# Patient Record
Sex: MALE | Race: WHITE | Employment: FULL TIME | URBAN - METROPOLITAN AREA
[De-identification: names, ages, dates, MRNs, and addresses within clinical notes are randomized per-mention and may not be internally consistent; named-entity substitution may affect disease eponyms.]

---

## 2018-01-23 ENCOUNTER — OFFICE VISIT (OUTPATIENT)
Dept: FAMILY MEDICINE | Facility: CLINIC | Age: 23
End: 2018-01-23
Payer: COMMERCIAL

## 2018-01-23 ENCOUNTER — TELEPHONE (OUTPATIENT)
Dept: FAMILY MEDICINE | Facility: CLINIC | Age: 23
End: 2018-01-23

## 2018-01-23 VITALS
SYSTOLIC BLOOD PRESSURE: 128 MMHG | DIASTOLIC BLOOD PRESSURE: 71 MMHG | WEIGHT: 219 LBS | HEART RATE: 58 BPM | HEIGHT: 74 IN | TEMPERATURE: 98.2 F | OXYGEN SATURATION: 97 % | BODY MASS INDEX: 28.11 KG/M2

## 2018-01-23 DIAGNOSIS — F41.9 ANXIETY: ICD-10-CM

## 2018-01-23 DIAGNOSIS — R74.01 ELEVATED ALT MEASUREMENT: ICD-10-CM

## 2018-01-23 DIAGNOSIS — F98.8 ATTENTION DEFICIT DISORDER, UNSPECIFIED HYPERACTIVITY PRESENCE: ICD-10-CM

## 2018-01-23 DIAGNOSIS — F33.41 RECURRENT MAJOR DEPRESSIVE DISORDER, IN PARTIAL REMISSION (H): Primary | ICD-10-CM

## 2018-01-23 LAB
ALBUMIN SERPL-MCNC: 4.3 G/DL (ref 3.4–5)
ALP SERPL-CCNC: 66 U/L (ref 40–150)
ALT SERPL W P-5'-P-CCNC: 60 U/L (ref 0–70)
AST SERPL W P-5'-P-CCNC: 31 U/L (ref 0–45)
BILIRUB DIRECT SERPL-MCNC: 0.1 MG/DL (ref 0–0.2)
BILIRUB SERPL-MCNC: 0.4 MG/DL (ref 0.2–1.3)
PROT SERPL-MCNC: 7.1 G/DL (ref 6.8–8.8)

## 2018-01-23 PROCEDURE — 99213 OFFICE O/P EST LOW 20 MIN: CPT | Performed by: INTERNAL MEDICINE

## 2018-01-23 PROCEDURE — 36415 COLL VENOUS BLD VENIPUNCTURE: CPT | Performed by: INTERNAL MEDICINE

## 2018-01-23 PROCEDURE — 80076 HEPATIC FUNCTION PANEL: CPT | Performed by: INTERNAL MEDICINE

## 2018-01-23 RX ORDER — LISDEXAMFETAMINE DIMESYLATE 30 MG/1
30 CAPSULE ORAL DAILY
Qty: 30 CAPSULE | Refills: 0 | Status: SHIPPED | OUTPATIENT
Start: 2018-03-26 | End: 2018-04-25

## 2018-01-23 RX ORDER — LISDEXAMFETAMINE DIMESYLATE 30 MG/1
30 CAPSULE ORAL DAILY
Qty: 30 CAPSULE | Refills: 0 | Status: SHIPPED | OUTPATIENT
Start: 2018-01-23 | End: 2018-02-22

## 2018-01-23 RX ORDER — ESCITALOPRAM OXALATE 10 MG/1
10 TABLET ORAL DAILY
Qty: 90 TABLET | Refills: 1 | Status: SHIPPED | OUTPATIENT
Start: 2018-01-23 | End: 2018-07-20

## 2018-01-23 RX ORDER — LISDEXAMFETAMINE DIMESYLATE 30 MG/1
30 CAPSULE ORAL DAILY
Qty: 30 CAPSULE | Refills: 0 | Status: SHIPPED | OUTPATIENT
Start: 2018-02-23 | End: 2018-03-25

## 2018-01-23 ASSESSMENT — PATIENT HEALTH QUESTIONNAIRE - PHQ9: SUM OF ALL RESPONSES TO PHQ QUESTIONS 1-9: 1

## 2018-01-23 NOTE — PROGRESS NOTES
It was a pleasure seeing you.  I wanted to get back to you with your test results.  I have enclosed a copy for your records.    Your liver tests are all normal, good news. If you have any questions please call me.

## 2018-01-23 NOTE — PROGRESS NOTES
The patient is here to follow-up on mental health issues.  He has been followed at the [facility] most recently for treatment of his depression, anxiety and ADD.  He is taking the medications listed for over 2 years and doing super.  He does not feel like he has any depression or anxiety issues.  He is not down, sad or anxious.    With respect to the ADD he has been on Vyvanse for a long time it takes 1 a day.  He does not use any drugs.  He does not smoke or drink significantly.  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  He feels quite well.  The ADD does help.    In reviewing his chart I see that he had a liver panel done in 2016 with an elevated ALT.  He has not had follow-up on this.  He has no known history of liver disease.  No family history of this.  He is not a drinker.    The patient feels fine.  No other symptoms..    Past Medical History:   Diagnosis Date     ADD (attention deficit disorder) 3rd grade    prior use of adderal     Anxiety 2010    on prozac, some mild depression     Major depression in partial remission (H)      Past Surgical History:   Procedure Laterality Date     circumcision revision  baby     HC REPAIR OF HYDROCELE,TUNICA  baby     uvuloplasty  2016    done by ent     Social History     Social History     Marital status: Single     Spouse name: N/A     Number of children: 0     Years of education: N/A     Occupational History     er tech      Social History Main Topics     Smoking status: Never Smoker     Smokeless tobacco: Never Used     Alcohol use Yes      Comment: occ     Drug use: No     Sexual activity: No     Other Topics Concern     Not on file     Social History Narrative     Current Outpatient Prescriptions   Medication Sig Dispense Refill     lisdexamfetamine (VYVANSE) 30 MG capsule Take 1 capsule (30 mg) by mouth daily 30 capsule 0     [START ON 2/23/2018] lisdexamfetamine (VYVANSE) 30 MG capsule Take 1 capsule (30 mg) by mouth  "daily 30 capsule 0     [START ON 3/26/2018] lisdexamfetamine (VYVANSE) 30 MG capsule Take 1 capsule (30 mg) by mouth daily 30 capsule 0     escitalopram (LEXAPRO) 10 MG tablet Take 1 tablet (10 mg) by mouth daily 90 tablet 1     [DISCONTINUED] escitalopram (LEXAPRO) 10 MG tablet Take 1 tablet (10 mg) by mouth daily 30 tablet 1     Allergies   Allergen Reactions     Ceclor [Cefaclor] Rash     FAMILY HISTORY NOTED AND REVIEWED    REVIEW OF SYSTEMS: above    PHYSICAL EXAM    /71  Pulse 58  Temp 98.2  F (36.8  C) (Oral)  Ht 6' 2\" (1.88 m)  Wt 219 lb (99.3 kg)  SpO2 97%  BMI 28.12 kg/m2    Patient appears non toxic  No exam but discussed with patient approx 15 min above    ASSESSMENT:  1. Depression and anxiety, doing well  2. Add, stable  3. Elevated lft, suspect fatty liver    PLAN:  Labs today  Follow up 3 months for med lucien Willard M.D.        "

## 2018-01-23 NOTE — TELEPHONE ENCOUNTER
PA needed on Vyvanse 30mg caps  Pt insurance is [employer]  Insurance phone number is [phone]  Pt ID number is [id number]    Please let us know when PA is granted/denied. Thank you!

## 2018-01-23 NOTE — NURSING NOTE
"Chief Complaint   Patient presents with     Recheck Medication       Initial /71  Pulse 58  Temp 98.2  F (36.8  C) (Oral)  Ht 6' 2\" (1.88 m)  Wt 219 lb (99.3 kg)  SpO2 97%  BMI 28.12 kg/m2 Estimated body mass index is 28.12 kg/(m^2) as calculated from the following:    Height as of this encounter: 6' 2\" (1.88 m).    Weight as of this encounter: 219 lb (99.3 kg).  Medication Reconciliation: complete   Desi Campos CMA      "

## 2018-01-23 NOTE — MR AVS SNAPSHOT
"              After Visit Summary   2018    Ha Lan    MRN: [id number]           Patient Information     Date Of Birth          1995        Visit Information        Provider Department      2018 9:00 AM Santi Willard MD [facility]        Today's Diagnoses     Recurrent major depressive disorder, in partial remission (H)    -  1    Attention deficit disorder, unspecified hyperactivity presence        Anxiety        Elevated ALT measurement           Follow-ups after your visit        Who to contact     If you have questions or need follow up information about today's clinic visit or your schedule please contact [facility] directly at [phone].  Normal or non-critical lab and imaging results will be communicated to you by MyChart, letter or phone within 4 business days after the clinic has received the results. If you do not hear from us within 7 days, please contact the clinic through MyChart or phone. If you have a critical or abnormal lab result, we will notify you by phone as soon as possible.  Submit refill requests through Ranberry or call your pharmacy and they will forward the refill request to us. Please allow 3 business days for your refill to be completed.          Additional Information About Your Visit        MyChart Information     Ranberry lets you send messages to your doctor, view your test results, renew your prescriptions, schedule appointments and more. To sign up, go to [email or web address] . Click on \"Log in\" on the left side of the screen, which will take you to the Welcome page. Then click on \"Sign up Now\" on the right side of the page.     You will be asked to enter the access code listed below, as well as some personal information. Please follow the directions to create your username and password.     Your access code is: [email or web address]  Expires: 2018  9:35 AM     Your access code will  in 90 days. If you need help or a new " "code, please call your [place] clinic or [phone].        Care EveryWhere ID     This is your Care EveryWhere ID. This could be used by other organizations to access your [place] medical records  [id number]        Your Vitals Were     Pulse Temperature Height Pulse Oximetry BMI (Body Mass Index)       58 98.2  F (36.8  C) (Oral) 6' 2\" (1.88 m) 97% 28.12 kg/m2        Blood Pressure from Last 3 Encounters:   01/23/18 128/71   05/18/15 126/77   06/13/14 114/70    Weight from Last 3 Encounters:   01/23/18 219 lb (99.3 kg)   05/18/15 228 lb 4.8 oz (103.6 kg)   06/13/14 215 lb (97.5 kg)              We Performed the Following     Hepatic panel     JUST IN CASE          Today's Medication Changes          These changes are accurate as of: 1/23/18  9:35 AM.  If you have any questions, ask your nurse or doctor.               These medicines have changed or have updated prescriptions.        Dose/Directions    * lisdexamfetamine 30 MG capsule   Commonly known as:  VYVANSE   This may have changed:    - medication strength  - how much to take  - when to take this   Used for:  Attention deficit disorder, unspecified hyperactivity presence   Changed by:  Santi Willard MD        Dose:  30 mg   Take 1 capsule (30 mg) by mouth daily   Quantity:  30 capsule   Refills:  0       * lisdexamfetamine 30 MG capsule   Commonly known as:  VYVANSE   This may have changed:  You were already taking a medication with the same name, and this prescription was added. Make sure you understand how and when to take each.   Used for:  Attention deficit disorder, unspecified hyperactivity presence   Changed by:  Santi Willard MD        Dose:  30 mg   Start taking on:  2/23/2018   Take 1 capsule (30 mg) by mouth daily   Quantity:  30 capsule   Refills:  0       * lisdexamfetamine 30 MG capsule   Commonly known as:  VYVANSE   This may have changed:  You were already taking a medication with the same name, and this prescription was " added. Make sure you understand how and when to take each.   Used for:  Attention deficit disorder, unspecified hyperactivity presence   Changed by:  Santi Willard MD        Dose:  30 mg   Start taking on:  3/26/2018   Take 1 capsule (30 mg) by mouth daily   Quantity:  30 capsule   Refills:  0       * Notice:  This list has 3 medication(s) that are the same as other medications prescribed for you. Read the directions carefully, and ask your doctor or other care provider to review them with you.         Where to get your medicines      Some of these will need a paper prescription and others can be bought over the counter.  Ask your nurse if you have questions.     Bring a paper prescription for each of these medications     escitalopram 10 MG tablet    lisdexamfetamine 30 MG capsule    lisdexamfetamine 30 MG capsule    lisdexamfetamine 30 MG capsule                Primary Care Provider Office Phone # Fax #    Santi Willard [phone] [address] [address]        Equal Access to Services     [facility] AH: Hadii aad ku hadasho Soomaali, waaxda luqadaha, qaybta kaalmada adeegyada, waxay idiin hayaan adeeg yeni gill . So [facility] [phone].    ATENCIÓN: Si habla español, tiene a augustine disposición servicios gratuitos de asistencia lingüística. Llame al [phone].    We comply with applicable federal civil rights laws and [place] laws. We do not discriminate on the basis of race, color, national origin, age, disability, sex, sexual orientation, or gender identity.            Thank you!     Thank you for choosing [facility]  for your care. Our goal is always to provide you with excellent care. Hearing back from our patients is one way we can continue to improve our services. Please take a few minutes to complete the written survey that you may receive in the mail after your visit with us. Thank you!             Your Updated Medication List -  Protect others around you: Learn how to safely use, store and throw away your medicines at www.disposemymeds.org.          This list is accurate as of: 1/23/18  9:35 AM.  Always use your most recent med list.                   Brand Name Dispense Instructions for use Diagnosis    escitalopram 10 MG tablet    LEXAPRO    90 tablet    Take 1 tablet (10 mg) by mouth daily    Recurrent major depressive disorder, in partial remission (H)       * lisdexamfetamine 30 MG capsule    VYVANSE    30 capsule    Take 1 capsule (30 mg) by mouth daily    Attention deficit disorder, unspecified hyperactivity presence       * lisdexamfetamine 30 MG capsule   Start taking on:  2/23/2018    VYVANSE    30 capsule    Take 1 capsule (30 mg) by mouth daily    Attention deficit disorder, unspecified hyperactivity presence       * lisdexamfetamine 30 MG capsule   Start taking on:  3/26/2018    VYVANSE    30 capsule    Take 1 capsule (30 mg) by mouth daily    Attention deficit disorder, unspecified hyperactivity presence       * Notice:  This list has 3 medication(s) that are the same as other medications prescribed for you. Read the directions carefully, and ask your doctor or other care provider to review them with you.

## 2018-01-23 NOTE — LETTER
[place] [address] [facility]  [address]  [place]  [address]  Tel: [phone]    January 23, 2018    Ha Lan  [facility] [facility] [address]        Dear Mr. Lan,    It was a pleasure seeing you.  I wanted to get back to you with your test results.  I have enclosed a copy for your records.     Your liver tests are all normal, good news. If you have any questions please call me.       Sincerely,    Santi Willard MD/andriy    Results for orders placed or performed in visit on 01/23/18   Hepatic panel   Result Value Ref Range    Bilirubin Direct 0.1 0.0 - 0.2 mg/dL    Bilirubin Total 0.4 0.2 - 1.3 mg/dL    Albumin 4.3 3.4 - 5.0 g/dL    Protein Total 7.1 6.8 - 8.8 g/dL    Alkaline Phosphatase 66 40 - 150 U/L    ALT 60 0 - 70 U/L    AST 31 0 - 45 U/L           Enclosure: Lab Results

## 2018-01-24 NOTE — TELEPHONE ENCOUNTER
PA has been sent to [employer] for further review of the Vyvanse capsule.    Manuel Spain, CMA

## 2018-01-29 NOTE — TELEPHONE ENCOUNTER
[employer] sent me a form with additional questions. I filled these out and sent back to [employer] for further review.    Manuel Spain, ELIZABETH

## 2018-02-01 NOTE — TELEPHONE ENCOUNTER
PA has been approved for the Vyvanse 30 mg capsules through [employer]. Approval is good from 1/29/18 - 1/29/19. I called the pharmacy to let them know.  Manuel Spain, CMA

## 2018-02-01 NOTE — TELEPHONE ENCOUNTER
I was also able to get this PA back-dated to 1/23/18 when the patient paid out of pocket for this drug.    Manuel Spain, CMA

## 2018-06-22 DIAGNOSIS — F33.41 RECURRENT MAJOR DEPRESSIVE DISORDER, IN PARTIAL REMISSION (H): Primary | ICD-10-CM

## 2018-06-22 RX ORDER — LISDEXAMFETAMINE DIMESYLATE 10 MG/1
25 CAPSULE ORAL
COMMUNITY
Start: 2015-05-16 | End: 2018-06-22

## 2018-06-22 NOTE — TELEPHONE ENCOUNTER
Reason for Call:  Medication or medication refill:    Do you use a [facility]?  Name of the pharmacy and phone number for the current request:      [facility] [place], [place] - [address]          Name of the medication requested: lisdexamfetamine (VYVANSE) 30 MG capsule        Other request:     Can we leave a detailed message on this number? YES    Phone number patient can be reached at: Home number on file [phone] (home)    Best Time:   Call taken on 6/22/2018 at 2:54 PM by Jenni Rehman

## 2018-06-25 NOTE — TELEPHONE ENCOUNTER
To PCP:     Routing refill request to provider for review/approval because:  Medication is reported/historical        Called pt to schedule OV, past due for 3  Month OV for Med Check    Pt Reps: Please schedule for OV upon return call and route back to Refills.     Thank you!   Maile RAI RN

## 2018-06-27 RX ORDER — LISDEXAMFETAMINE DIMESYLATE 30 MG/1
25 CAPSULE ORAL EVERY MORNING
Qty: 30 CAPSULE | Refills: 0 | Status: SHIPPED | OUTPATIENT
Start: 2018-06-27 | End: 2018-07-20

## 2018-06-27 NOTE — TELEPHONE ENCOUNTER
Routing refill request to provider for review/approval because:  Medication is reported/historical    Thank you,  Eleno BARAKAT RN

## 2018-07-20 ENCOUNTER — OFFICE VISIT (OUTPATIENT)
Dept: FAMILY MEDICINE | Facility: CLINIC | Age: 23
End: 2018-07-20
Payer: COMMERCIAL

## 2018-07-20 VITALS
OXYGEN SATURATION: 96 % | WEIGHT: 226.2 LBS | BODY MASS INDEX: 29.03 KG/M2 | HEART RATE: 70 BPM | HEIGHT: 74 IN | DIASTOLIC BLOOD PRESSURE: 74 MMHG | SYSTOLIC BLOOD PRESSURE: 131 MMHG | TEMPERATURE: 97.6 F

## 2018-07-20 DIAGNOSIS — F98.8 ATTENTION DEFICIT DISORDER, UNSPECIFIED HYPERACTIVITY PRESENCE: Primary | ICD-10-CM

## 2018-07-20 DIAGNOSIS — F33.41 RECURRENT MAJOR DEPRESSIVE DISORDER, IN PARTIAL REMISSION (H): ICD-10-CM

## 2018-07-20 PROCEDURE — 99213 OFFICE O/P EST LOW 20 MIN: CPT | Performed by: INTERNAL MEDICINE

## 2018-07-20 RX ORDER — ESCITALOPRAM OXALATE 10 MG/1
10 TABLET ORAL DAILY
Qty: 90 TABLET | Refills: 1 | Status: SHIPPED | OUTPATIENT
Start: 2018-07-20 | End: 2018-10-22

## 2018-07-20 RX ORDER — LISDEXAMFETAMINE DIMESYLATE 30 MG/1
25 CAPSULE ORAL EVERY MORNING
Qty: 60 CAPSULE | Refills: 0 | Status: SHIPPED | OUTPATIENT
Start: 2018-07-20 | End: 2018-10-03

## 2018-07-20 NOTE — MR AVS SNAPSHOT
After Visit Summary   7/20/2018    Ha Lan    MRN: [id number]           Patient Information     Date Of Birth          1995        Visit Information        Provider Department      7/20/2018 2:30 PM Santi Willard MD [facility]        Today's Diagnoses     Attention deficit disorder, unspecified hyperactivity presence    -  1    Recurrent major depressive disorder, in partial remission (H)           Follow-ups after your visit        Your next 10 appointments already scheduled     Oct 22, 2018  3:00 PM CDT   Office Visit with Santi Willard MD   [facility]    [address] [facility] [id number] [phone]           Bring a current list of meds and any records pertaining to this visit. For Physicals, please bring immunization records and any forms needing to be filled out. Please arrive 10 minutes early to complete paperwork.              Who to contact     If you have questions or need follow up information about today's clinic visit or your schedule please contact [facility] directly at [phone].  Normal or non-critical lab and imaging results will be communicated to you by artandseekhart, letter or phone within 4 business days after the clinic has received the results. If you do not hear from us within 7 days, please contact the clinic through [employer] or phone. If you have a critical or abnormal lab result, we will notify you by phone as soon as possible.  Submit refill requests through [employer] or call your pharmacy and they will forward the refill request to us. Please allow 3 business days for your refill to be completed.          Additional Information About Your Visit        MyChart Information     [employer] gives you secure access to your electronic health record. If you see a primary care provider, you can also send messages to your care team and make appointments. If you have questions, please call your  "primary care clinic.  If you do not have a primary care provider, please call [phone] and they will assist you.        Care EveryWhere ID     This is your Care EveryWhere ID. This could be used by other organizations to access your [place] medical records  [id number]        Your Vitals Were     Pulse Temperature Height Pulse Oximetry BMI (Body Mass Index)       70 97.6  F (36.4  C) (Oral) 6' 2\" (1.88 m) 96% 29.04 kg/m2        Blood Pressure from Last 3 Encounters:   07/20/18 131/74   01/23/18 128/71   05/18/15 126/77    Weight from Last 3 Encounters:   07/20/18 226 lb 3.2 oz (102.6 kg)   01/23/18 219 lb (99.3 kg)   05/18/15 228 lb 4.8 oz (103.6 kg)              Today, you had the following     No orders found for display         Where to get your medicines      These medications were sent to [id number] - [place], [place] - [address] [facility]  [address], [place] [address]    Hours:  24-hours Phone:  [phone]     escitalopram 10 MG tablet         Some of these will need a paper prescription and others can be bought over the counter.  Ask your nurse if you have questions.     Bring a paper prescription for each of these medications     lisdexamfetamine 30 MG capsule          Primary Care Provider Office Phone # Fax #    Santi Willard -[phone] [address] [facility] [address]  [facility] [address]        Equal Access to Services     [facility]: Hadii armando benites hadrafita Sobrian, waaxda luqadaha, qaybta kaalmanorberto luis. So [facility] [phone].    ATENCIÓN: Si habla español, tiene a augustine disposición servicios gratuitos de asistencia lingüística. Llame al [phone].    We comply with applicable federal civil rights laws and [place] laws. We do not discriminate on the basis of race, color, national origin, age, disability, sex, sexual orientation, or gender identity.            Thank you!     Thank you " for choosing [facility]  for your care. Our goal is always to provide you with excellent care. Hearing back from our patients is one way we can continue to improve our services. Please take a few minutes to complete the written survey that you may receive in the mail after your visit with us. Thank you!             Your Updated Medication List - Protect others around you: Learn how to safely use, store and throw away your medicines at www.disposemymeds.org.          This list is accurate as of 7/20/18  3:38 PM.  Always use your most recent med list.                   Brand Name Dispense Instructions for use Diagnosis    escitalopram 10 MG tablet    LEXAPRO    90 tablet    Take 1 tablet (10 mg) by mouth daily    Recurrent major depressive disorder, in partial remission (H)       lisdexamfetamine 30 MG capsule    VYVANSE    60 capsule    Take 1 capsule (30 mg) by mouth every morning    Recurrent major depressive disorder, in partial remission (H)

## 2018-07-20 NOTE — PROGRESS NOTES
"The patient is here to follow-up on his depression and ADD.  He is doing extremely well.  He is taking his medicines regularly and really does not have any significant depression or anxiety.  He uses the Vyvanse regularly and it seems to help quite a bit.  He has no side effects from this.  He otherwise feels very well.  He is working the overnight shift as an ER tech.  He has no complaints at this time.    Past Medical History:   Diagnosis Date     ADD (attention deficit disorder) 3rd grade    prior use of adderal     Anxiety 2010    on prozac, some mild depression     Major depression in partial remission (H)      Past Surgical History:   Procedure Laterality Date     circumcision revision  baby     HC REPAIR OF HYDROCELE,TUNICA  baby     uvuloplasty  2016    done by ent     Social History     Social History     Marital status: Single     Spouse name: N/A     Number of children: 0     Years of education: N/A     Occupational History     er tech      Social History Main Topics     Smoking status: Never Smoker     Smokeless tobacco: Never Used     Alcohol use Yes      Comment: occ     Drug use: No     Sexual activity: No     Other Topics Concern     Not on file     Social History Narrative     Current Outpatient Prescriptions   Medication Sig Dispense Refill     escitalopram (LEXAPRO) 10 MG tablet Take 1 tablet (10 mg) by mouth daily 90 tablet 1     lisdexamfetamine (VYVANSE) 30 MG capsule Take 1 capsule (30 mg) by mouth every morning 60 capsule 0     [DISCONTINUED] escitalopram (LEXAPRO) 10 MG tablet Take 1 tablet (10 mg) by mouth daily 90 tablet 1     Allergies   Allergen Reactions     Ceclor [Cefaclor] Rash     FAMILY HISTORY NOTED AND REVIEWED    REVIEW OF SYSTEMS: above    PHYSICAL EXAM    /74 (BP Location: Left arm, Patient Position: Chair, Cuff Size: Adult Large)  Pulse 70  Temp 97.6  F (36.4  C) (Oral)  Ht 6' 2\" (1.88 m)  Wt 226 lb 3.2 oz (102.6 kg)  SpO2 96%  BMI 29.04 kg/m2    Patient appears " non toxic  No exam but discussed with patient above    ASSESSMENT:  1. Add, stable  2. Depression, doing well    PLAN:  Refilled meds  Call if problems    Santi Willard M.D.

## 2018-07-21 ASSESSMENT — PATIENT HEALTH QUESTIONNAIRE - PHQ9: SUM OF ALL RESPONSES TO PHQ QUESTIONS 1-9: 1

## 2018-10-03 ENCOUNTER — MYC MEDICAL ADVICE (OUTPATIENT)
Dept: FAMILY MEDICINE | Facility: CLINIC | Age: 23
End: 2018-10-03

## 2018-10-03 DIAGNOSIS — F33.41 RECURRENT MAJOR DEPRESSIVE DISORDER, IN PARTIAL REMISSION (H): ICD-10-CM

## 2018-10-03 RX ORDER — LISDEXAMFETAMINE DIMESYLATE 30 MG/1
30 CAPSULE ORAL EVERY MORNING
Qty: 30 CAPSULE | Refills: 0 | Status: SHIPPED | OUTPATIENT
Start: 2018-10-03 | End: 2018-10-22

## 2018-10-03 NOTE — TELEPHONE ENCOUNTER
Routing to [facility] to review patient's MyLuvs message.   Please route back to RN so that patient can be notified.      Pended Rx Vyvanse---30 days.     Last Written Prescription Date:  7-20-18  Last Fill Quantity: 60,  # refills: 0   Last office visit: 7/20/2018 with prescribing provider:  7-20-18   Future Office Visit:   Next 5 appointments (look out 90 days)     Oct 22, 2018  3:00 PM CDT   [facility]    [address] [facility] [address]   [phone]                 Kerrie AMANDA RN,BSN

## 2018-10-22 ENCOUNTER — OFFICE VISIT (OUTPATIENT)
Dept: FAMILY MEDICINE | Facility: CLINIC | Age: 23
End: 2018-10-22
Payer: COMMERCIAL

## 2018-10-22 VITALS
HEIGHT: 74 IN | OXYGEN SATURATION: 96 % | DIASTOLIC BLOOD PRESSURE: 80 MMHG | TEMPERATURE: 97.5 F | WEIGHT: 226 LBS | BODY MASS INDEX: 29 KG/M2 | SYSTOLIC BLOOD PRESSURE: 127 MMHG | HEART RATE: 77 BPM

## 2018-10-22 DIAGNOSIS — F33.41 RECURRENT MAJOR DEPRESSIVE DISORDER, IN PARTIAL REMISSION (H): ICD-10-CM

## 2018-10-22 DIAGNOSIS — F98.8 ATTENTION DEFICIT DISORDER, UNSPECIFIED HYPERACTIVITY PRESENCE: ICD-10-CM

## 2018-10-22 DIAGNOSIS — F41.9 ANXIETY: Primary | ICD-10-CM

## 2018-10-22 PROCEDURE — 99212 OFFICE O/P EST SF 10 MIN: CPT | Performed by: INTERNAL MEDICINE

## 2018-10-22 RX ORDER — LISDEXAMFETAMINE DIMESYLATE 30 MG/1
30 CAPSULE ORAL EVERY MORNING
Qty: 30 CAPSULE | Refills: 0 | Status: SHIPPED | OUTPATIENT
Start: 2018-10-22 | End: 2018-10-22

## 2018-10-22 RX ORDER — ESCITALOPRAM OXALATE 10 MG/1
10 TABLET ORAL DAILY
Qty: 90 TABLET | Refills: 3 | Status: SHIPPED | OUTPATIENT
Start: 2018-10-22 | End: 2019-10-29

## 2018-10-22 RX ORDER — LISDEXAMFETAMINE DIMESYLATE 30 MG/1
30 CAPSULE ORAL EVERY MORNING
Qty: 30 CAPSULE | Refills: 0 | Status: SHIPPED | OUTPATIENT
Start: 2018-10-22 | End: 2019-03-11

## 2018-10-22 NOTE — PROGRESS NOTES
In the past he been followed at the [facility] but due to good stability the patient is here for follow-up on his depression, anxiety and ADD.  In the past he had been seen at the [facility] and as of January I had been seeing him for this.  He has been on Vyvanse for a long time taking 1 a day and this works very well.  There is been no signs of abuse.  He does not use any drugs.  He is not a drinker.    With respect to the depression and anxiety he has been taking his Lexapro and with this is done quite well without any problems or recurrent symptoms.  He does not feel down, sad or depressed.    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  He stays very busy.  He will begin his flu shot there.  He has not been sexually active.    Past Medical History:   Diagnosis Date     ADD (attention deficit disorder) 3rd grade    prior use of adderal     Anxiety 2010    on prozac, some mild depression     Major depression in partial remission (H)      Past Surgical History:   Procedure Laterality Date     circumcision revision  baby     HC REPAIR OF HYDROCELE,TUNICA  baby     uvuloplasty  2016    done by ent     Social History     Social History     Marital status: Single     Spouse name: N/A     Number of children: 0     Years of education: N/A     Occupational History     er tech      Social History Main Topics     Smoking status: Never Smoker     Smokeless tobacco: Never Used     Alcohol use Yes      Comment: occ     Drug use: No     Sexual activity: No     Other Topics Concern     Not on file     Social History Narrative     Current Outpatient Prescriptions   Medication Sig Dispense Refill     escitalopram (LEXAPRO) 10 MG tablet Take 1 tablet (10 mg) by mouth daily 90 tablet 3     lisdexamfetamine (VYVANSE) 30 MG capsule Take 1 capsule (30 mg) by mouth every morning 30 capsule 0     [DISCONTINUED] escitalopram (LEXAPRO) 10 MG tablet Take 1 tablet (10 mg) by mouth daily 90  "tablet 1     Allergies   Allergen Reactions     Ceclor [Cefaclor] Rash     FAMILY HISTORY NOTED AND REVIEWED    REVIEW OF SYSTEMS: above    PHYSICAL EXAM    /80 (BP Location: Right arm, Cuff Size: Adult Large)  Pulse 77  Temp 97.5  F (36.4  C) (Tympanic)  Ht 6' 2\" (1.88 m)  Wt 226 lb (102.5 kg)  SpO2 96%  BMI 29.02 kg/m2    Patient appears non toxic  No exam but discussed with patient above    ASSESSMENT:  Add, depression and anxiety, doing well, no signs med abuse or misuse.    PLAN:  Same meds  Follow up 6 months or prn    Santi Willard M.D.        "

## 2018-10-22 NOTE — MR AVS SNAPSHOT
After Visit Summary   10/22/2018    Ha Lan    MRN: [id number]           Patient Information     Date Of Birth          1995        Visit Information        Provider Department      10/22/2018 3:00 PM Santi Willard MD [facility]        Today's Diagnoses     Anxiety    -  1    Recurrent major depressive disorder, in partial remission (H)        Attention deficit disorder, unspecified hyperactivity presence           Follow-ups after your visit        Follow-up notes from your care team     Return in about 6 months (around 4/22/2019) for Routine Visit.      Your next 10 appointments already scheduled     Apr 16, 2019  3:00 PM CDT   Office Visit with Santi Willard MD   [facility] ([facility]    [address] [facility] [address] [phone]           Bring a current list of meds and any records pertaining to this visit. For Physicals, please bring immunization records and any forms needing to be filled out. Please arrive 10 minutes early to complete paperwork.              Who to contact     If you have questions or need follow up information about today's clinic visit or your schedule please contact [facility] directly at [phone].  Normal or non-critical lab and imaging results will be communicated to you by MyChart, letter or phone within 4 business days after the clinic has received the results. If you do not hear from us within 7 days, please contact the clinic through [employer] or phone. If you have a critical or abnormal lab result, we will notify you by phone as soon as possible.  Submit refill requests through [employer] or call your pharmacy and they will forward the refill request to us. Please allow 3 business days for your refill to be completed.          Additional Information About Your Visit        MyChart Information     [employer] gives you secure access to your electronic health record. If you see a  "primary care provider, you can also send messages to your care team and make appointments. If you have questions, please call your primary care clinic.  If you do not have a primary care provider, please call [phone] and they will assist you.        Care EveryWhere ID     This is your Care EveryWhere ID. This could be used by other organizations to access your [place] medical records  [id number]        Your Vitals Were     Pulse Temperature Height Pulse Oximetry BMI (Body Mass Index)       77 97.5  F (36.4  C) (Tympanic) 6' 2\" (1.88 m) 96% 29.02 kg/m2        Blood Pressure from Last 3 Encounters:   10/22/18 127/80   07/20/18 131/74   01/23/18 128/71    Weight from Last 3 Encounters:   10/22/18 226 lb (102.5 kg)   07/20/18 226 lb 3.2 oz (102.6 kg)   01/23/18 219 lb (99.3 kg)              Today, you had the following     No orders found for display         Today's Medication Changes          These changes are accurate as of 10/22/18  3:17 PM.  If you have any questions, ask your nurse or doctor.               Start taking these medicines.        Dose/Directions    lisdexamfetamine 30 MG capsule   Commonly known as:  VYVANSE   Used for:  Recurrent major depressive disorder, in partial remission (H)   Started by:  Santi Willard MD        Dose:  30 mg   Take 1 capsule (30 mg) by mouth every morning   Quantity:  30 capsule   Refills:  0            Where to get your medicines      [facility] [address] [place] [address] [address] & [facility]  [address] [place] [id number]    Hours:  24-hours Phone:  [phone]     escitalopram 10 MG tablet         Some of these will need a paper prescription and others can be bought over the counter.  Ask your nurse if you have questions.     Bring a paper prescription for each of these medications     lisdexamfetamine 30 MG capsule                Primary Care Provider Office Phone # Fax #    Santi Escobar " MD [facility] [phone]       [address] [facility] [address]  [facility] [address]        Equal Access to Services     EDA FULTON : Hadii aad ku hadelisusan Pruitt, sera dalal, melaniamichael kameghnada ansley, norberto jarrellin hayaacristela thurstonjoaquina jaime jairo devine. So [facility] [phone].    ATENCIÓN: Si habla español, tiene a augustine disposición servicios gratuitos de asistencia lingüística. Llame al [phone].    We comply with applicable federal civil rights laws and [place] laws. We do not discriminate on the basis of race, color, national origin, age, disability, sex, sexual orientation, or gender identity.            Thank you!     Thank you for choosing [facility]  for your care. Our goal is always to provide you with excellent care. Hearing back from our patients is one way we can continue to improve our services. Please take a few minutes to complete the written survey that you may receive in the mail after your visit with us. Thank you!             Your Updated Medication List - Protect others around you: Learn how to safely use, store and throw away your medicines at www.disposemymeds.org.          This list is accurate as of 10/22/18  3:17 PM.  Always use your most recent med list.                   Brand Name Dispense Instructions for use Diagnosis    escitalopram 10 MG tablet    LEXAPRO    90 tablet    Take 1 tablet (10 mg) by mouth daily    Recurrent major depressive disorder, in partial remission (H)       lisdexamfetamine 30 MG capsule    VYVANSE    30 capsule    Take 1 capsule (30 mg) by mouth every morning    Recurrent major depressive disorder, in partial remission (H)

## 2019-02-28 DIAGNOSIS — F33.41 RECURRENT MAJOR DEPRESSIVE DISORDER, IN PARTIAL REMISSION (H): ICD-10-CM

## 2019-02-28 NOTE — TELEPHONE ENCOUNTER
"Last Written Prescription Date:  10/22/18  Last Fill Quantity: 90 tablet,  # refills: 3   Last office visit: 10/22/2018 with prescribing provider:  Montse   Future Office Visit:   Next 5 appointments (look out 90 days)    Apr 16, 2019  3:00 PM CDT  Office Visit with Santi Willard MD  [facility] ([facility]) [address] [facility] [id number] [phone]         Requested Prescriptions   Pending Prescriptions Disp Refills     escitalopram (LEXAPRO) 10 MG tablet [Pharmacy Med Name: ESCITALOPRAM 10MG TABLETS] 90 tablet 0     Sig: TAKE 1 TABLET(10 MG) BY MOUTH DAILY    SSRIs Protocol Failed - 2/28/2019  3:04 AM       Failed - PHQ-9 score less than 5 in past 6 months    Please review last PHQ-9 score.          Passed - Medication is active on med list       Passed - Patient is age 18 or older       Passed - Recent (6 mo) or future (30 days) visit within the authorizing provider's specialty    Patient had office visit in the last 6 months or has a visit in the next 30 days with authorizing provider or within the authorizing provider's specialty.  See \"Patient Info\" tab in inbasket, or \"Choose Columns\" in Meds & Orders section of the refill encounter.            [facility] Follow-up to PHQ 1/23/2018 7/20/2018   PHQ-9 9. Suicide Ideation past 2 weeks Not at all Not at all     No flowsheet data found.        "

## 2019-03-01 RX ORDER — ESCITALOPRAM OXALATE 10 MG/1
TABLET ORAL
Qty: 60 TABLET | Refills: 0 | Status: SHIPPED | OUTPATIENT
Start: 2019-03-01 | End: 2019-04-16

## 2019-03-01 NOTE — TELEPHONE ENCOUNTER
PHQ-9 SCORE 5/18/2015 1/23/2018 7/20/2018   PHQ-9 Total Score 2 - -   PHQ-9 Total Score - 1 1     Medication is being filled for 1 time refill only due to:  Due for depression questionnaire (Instant API message sent to notify pt)     Donna LOCO RN

## 2019-03-05 ENCOUNTER — TELEPHONE (OUTPATIENT)
Dept: FAMILY MEDICINE | Facility: CLINIC | Age: 24
End: 2019-03-05

## 2019-03-05 NOTE — TELEPHONE ENCOUNTER
Prior Authorization Retail Medication Request    Medication/Dose: Vyvanse 30 mg  ICD code (if different than what is on RX):  F33.41  Previously Tried and Failed:  None  Rationale:  Patient stable on medication since 2015    Insurance Name:  [facility]  Insurance ID:  [id number]      Pharmacy Information (if different than what is on RX)  Name:  [id number]  Phone:  [phone]

## 2019-03-08 NOTE — TELEPHONE ENCOUNTER
PA Initiation    Medication: Vyvanse 30 mg- INITIATED  Insurance Company: [employer] - Phone [phone] Fax [phone]  [facility] the [facility] [address] [place] [address] [place] - [address] AT [address] [place] [address] & [facility]  Filling Pharmacy Phone: [phone]  Filling Pharmacy Fax:    Start Date: 3/8/2019

## 2019-03-11 ENCOUNTER — MYC REFILL (OUTPATIENT)
Dept: FAMILY MEDICINE | Facility: CLINIC | Age: 24
End: 2019-03-11

## 2019-03-11 DIAGNOSIS — F98.8 ATTENTION DEFICIT DISORDER, UNSPECIFIED HYPERACTIVITY PRESENCE: ICD-10-CM

## 2019-03-11 DIAGNOSIS — F33.41 RECURRENT MAJOR DEPRESSIVE DISORDER, IN PARTIAL REMISSION (H): ICD-10-CM

## 2019-03-11 NOTE — TELEPHONE ENCOUNTER
Prior Authorization Approval    Authorization Effective Date: 3/11/2019  Authorization Expiration Date: 3/10/2020  Medication: Vyvanse 30 mg- APPROVED  Approved Dose/Quantity: 30 PER 30  Reference #: [id number]   Insurance Company: [employer] - Phone [phone] Fax [phone]  Expected CoPay:       CoPay Card Available:      Foundation Assistance Needed:    Which Pharmacy is filling the prescription (Not needed for infusion/clinic administered): [facility] [id number] [place] [address] [address]  Pharmacy Notified: Yes  Patient Notified: Yes

## 2019-03-13 RX ORDER — LISDEXAMFETAMINE DIMESYLATE 30 MG/1
30 CAPSULE ORAL EVERY MORNING
Qty: 30 CAPSULE | Refills: 0 | Status: SHIPPED | OUTPATIENT
Start: 2019-04-12 | End: 2019-04-16

## 2019-03-13 NOTE — TELEPHONE ENCOUNTER
To PCP/PA Team:     Please review and advise on Pt's [employer] Message:     The pharmacy said I needed a prior authorization from my insurance company before they can refill the script I dropped off there last week.  The pharmacy said they sent a message to your office last week.  I am currently out of this medication and am wondering how long it will take to get the prior authorization taken care of.     Pt just filled on 3/11/19. This would be for next month's fill. (Placed start date on RX 4/12/19)    lisdexamfetamine (VYVANSE) 30 MG capsule  Last Written Prescription Date:  10/22/18  Last Fill Quantity: 30,   # refills: RX's written through   Last Office Visit: 10/2018- Pt is due for OV. (Sent [employer] Message)  Future Office visit:    Next 5 appointments (look out 90 days)    Apr 16, 2019  3:00 PM CDT  Office Visit with Santi Willard MD  [facility] ([facility]) [address] [facility] [address]  [phone]           Routing refill request to provider for review/approval because:  Drug not on the FMG, UMP or  Health refill protocol or controlled substance      Thank you!   Maile RAI RN

## 2019-04-16 ENCOUNTER — OFFICE VISIT (OUTPATIENT)
Dept: FAMILY MEDICINE | Facility: CLINIC | Age: 24
End: 2019-04-16
Payer: COMMERCIAL

## 2019-04-16 VITALS
BODY MASS INDEX: 29.52 KG/M2 | OXYGEN SATURATION: 98 % | SYSTOLIC BLOOD PRESSURE: 117 MMHG | DIASTOLIC BLOOD PRESSURE: 80 MMHG | HEIGHT: 74 IN | HEART RATE: 67 BPM | TEMPERATURE: 98 F | WEIGHT: 230 LBS

## 2019-04-16 DIAGNOSIS — Z23 NEED FOR VACCINATION: ICD-10-CM

## 2019-04-16 DIAGNOSIS — F33.41 RECURRENT MAJOR DEPRESSIVE DISORDER, IN PARTIAL REMISSION (H): ICD-10-CM

## 2019-04-16 DIAGNOSIS — F98.8 ATTENTION DEFICIT DISORDER, UNSPECIFIED HYPERACTIVITY PRESENCE: Primary | ICD-10-CM

## 2019-04-16 DIAGNOSIS — F41.9 ANXIETY: ICD-10-CM

## 2019-04-16 PROCEDURE — 99213 OFFICE O/P EST LOW 20 MIN: CPT | Mod: 25 | Performed by: INTERNAL MEDICINE

## 2019-04-16 PROCEDURE — 90471 IMMUNIZATION ADMIN: CPT | Performed by: INTERNAL MEDICINE

## 2019-04-16 PROCEDURE — 90715 TDAP VACCINE 7 YRS/> IM: CPT | Performed by: INTERNAL MEDICINE

## 2019-04-16 RX ORDER — LISDEXAMFETAMINE DIMESYLATE 30 MG/1
30 CAPSULE ORAL EVERY MORNING
Qty: 30 CAPSULE | Refills: 0 | Status: SHIPPED | OUTPATIENT
Start: 2019-04-16 | End: 2019-04-16

## 2019-04-16 RX ORDER — LISDEXAMFETAMINE DIMESYLATE 30 MG/1
30 CAPSULE ORAL EVERY MORNING
Qty: 30 CAPSULE | Refills: 0 | Status: SHIPPED | OUTPATIENT
Start: 2019-04-16 | End: 2019-04-24

## 2019-04-16 ASSESSMENT — MIFFLIN-ST. JEOR: SCORE: 2098.02

## 2019-04-16 NOTE — PROGRESS NOTES
The patient presents for follow-up for his anxiety, depression and ADD.  He is taking his 2 medications regularly and doing super.  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  He is not having any depression or sadness and no anxiety.  He does not use any drugs.  He does not smoke and has minimal alcohol.  He is not sexually active.  He does exercise some.  He has no complaints.    Past Medical History:   Diagnosis Date     ADD (attention deficit disorder) 3rd grade    prior use of adderal     Anxiety 2010    on prozac, some mild depression     Major depression in partial remission (H)      Past Surgical History:   Procedure Laterality Date     circumcision revision  baby     HC REPAIR OF HYDROCELE,TUNICA  baby     uvuloplasty  2016    done by ent     Social History     Socioeconomic History     Marital status: Single     Spouse name: Not on file     Number of children: 0     Years of education: Not on file     Highest education level: Not on file   Occupational History     Occupation: er tech   Social Needs     Financial resource strain: Not on file     Food insecurity:     Worry: Not on file     Inability: Not on file     Transportation needs:     Medical: Not on file     Non-medical: Not on file   Tobacco Use     Smoking status: Never Smoker     Smokeless tobacco: Never Used   Substance and Sexual Activity     Alcohol use: Yes     Comment: occ     Drug use: No     Sexual activity: Never   Lifestyle     Physical activity:     Days per week: Not on file     Minutes per session: Not on file     Stress: Not on file   Relationships     Social connections:     Talks on phone: Not on file     Gets together: Not on file     Attends Caodaism service: Not on file     Active member of club or organization: Not on file     Attends meetings of clubs or organizations: Not on file     Relationship status: Not on file     Intimate partner violence:     Fear of current or ex partner: Not on  "file     Emotionally abused: Not on file     Physically abused: Not on file     Forced sexual activity: Not on file   Other Topics Concern     Parent/sibling w/ CABG, MI or angioplasty before 65F 55M? Not Asked   Social History Narrative     Not on file     Current Outpatient Medications   Medication Sig Dispense Refill     escitalopram (LEXAPRO) 10 MG tablet Take 1 tablet (10 mg) by mouth daily 90 tablet 3     lisdexamfetamine (VYVANSE) 30 MG capsule Take 1 capsule (30 mg) by mouth every morning 30 capsule 0     Allergies   Allergen Reactions     Ceclor [Cefaclor] Rash     FAMILY HISTORY NOTED AND REVIEWED    REVIEW OF SYSTEMS: above    PHYSICAL EXAM    /80 (BP Location: Left arm, Patient Position: Chair, Cuff Size: Adult Large)   Pulse 67   Temp 98  F (36.7  C) (Oral)   Ht 1.88 m (6' 2\")   Wt 104.3 kg (230 lb)   SpO2 98%   BMI 29.53 kg/m      Patient appears non toxic  No exam    ASSESSMENT:  1. Add, stable  2. Anxiety and depression, doing well    PLAN:  tdap  Exercise, diet  Call if problems      "

## 2019-04-22 DIAGNOSIS — F33.41 RECURRENT MAJOR DEPRESSIVE DISORDER, IN PARTIAL REMISSION (H): ICD-10-CM

## 2019-04-22 RX ORDER — LISDEXAMFETAMINE DIMESYLATE 30 MG/1
30 CAPSULE ORAL EVERY MORNING
Qty: 30 CAPSULE | Refills: 0 | Status: CANCELLED | OUTPATIENT
Start: 2019-04-22

## 2019-04-22 NOTE — TELEPHONE ENCOUNTER
Last OV was 4/16/19    Last fill was 4/16/19 for 30 tablets no refill with a note to pharmacy not to fill until 6/11/19.     Should pt have enough until June?

## 2019-04-22 NOTE — TELEPHONE ENCOUNTER
Can't refill without knowing date of last office visit and date of last refill    Thanks    Santi Willard M.D.

## 2019-04-22 NOTE — TELEPHONE ENCOUNTER
Reason for Call:  Medication or medication refill:    Do you use a [facility]?  Name of the pharmacy and phone number for the current request:     [facility] [address] [place], [place] - [address]      Name of the medication requested:lisdexamfetamine (VYVANSE) 30 MG capsule      Can we leave a detailed message on this number? YES    Phone number patient can be reached at: Cell number on file:    Telephone Information:   Mobile [phone]       Best Time: any    Call taken on 4/22/2019 at 12:14 PM by Marifer Krueger

## 2019-04-23 NOTE — TELEPHONE ENCOUNTER
Per  and dispense record last filled #30 on 3/11/19    Called [facility] - they verified they do not have a paper script from 4/16.     Will await call back from patient     Donna LOCO RN

## 2019-04-23 NOTE — TELEPHONE ENCOUNTER
Called patient: No answer, left VM to return call to clinic.     Inquiring if he received the RX at last OV 4/16.     Maile RAI RN

## 2019-04-24 RX ORDER — LISDEXAMFETAMINE DIMESYLATE 30 MG/1
30 CAPSULE ORAL EVERY MORNING
Qty: 30 CAPSULE | Refills: 0 | Status: SHIPPED | OUTPATIENT
Start: 2019-04-24 | End: 2019-08-05

## 2019-04-24 NOTE — TELEPHONE ENCOUNTER
Per medication list I did give patient a written prescription, actually 3 of them for sequential months, given at last office visit.    Santi Willard M.D.

## 2019-04-24 NOTE — TELEPHONE ENCOUNTER
Spoke with patient:     On 4/16 pt received only 2 RX's:   OK to fill after: 5/12  OK to fill after: 6/11     3/13/19   RX was signed with: Earliest fill date 4/12/19 (this should be April's fill) - Dr. Willard- this was a Wednesday, it may not have printed. There is no documentation that this RX was placed up front for pick-up or that pt was called.     Please advise if we can re-sign RX for April's fill- RX is pended.     If signed, please route back to [facility] and I can verify that this printed in clinic.     Thank you!  Maile RAI RN

## 2019-08-05 ENCOUNTER — MYC REFILL (OUTPATIENT)
Dept: FAMILY MEDICINE | Facility: CLINIC | Age: 24
End: 2019-08-05

## 2019-08-05 DIAGNOSIS — F98.8 ATTENTION DEFICIT DISORDER, UNSPECIFIED HYPERACTIVITY PRESENCE: ICD-10-CM

## 2019-08-05 DIAGNOSIS — F33.41 RECURRENT MAJOR DEPRESSIVE DISORDER, IN PARTIAL REMISSION (H): ICD-10-CM

## 2019-08-08 RX ORDER — LISDEXAMFETAMINE DIMESYLATE 30 MG/1
30 CAPSULE ORAL EVERY MORNING
Qty: 30 CAPSULE | Refills: 0 | Status: SHIPPED | OUTPATIENT
Start: 2019-08-08 | End: 2019-11-05

## 2019-08-08 NOTE — TELEPHONE ENCOUNTER
lisdexamfetamine (VYVANSE) 30 MG capsule  Last Written Prescription Date:  4/24/19/Last filled date 6/29/19 (per )   Last Fill Quantity: 30,   # refills: 0  Last Office Visit: 4/16/19  Future Office visit:       Routing refill request to provider for review/approval because:  Drug not on the FMG, P or [facility] refill protocol or controlled substance    Medication(s): lisdexamfetamine (VYVANSE) 30 MG capsule  Maximum quantity per month: 30  Clinic visit frequency required: Q 6  months     Controlled substance agreement on file: No  Neuropsych evaluation for ADD completed:      Last [facility] website verification:  08/08/19 No Concerns   [email or web address]

## 2019-10-29 DIAGNOSIS — F33.41 RECURRENT MAJOR DEPRESSIVE DISORDER, IN PARTIAL REMISSION (H): ICD-10-CM

## 2019-10-29 NOTE — TELEPHONE ENCOUNTER
"Last Written Prescription Date:  10/22/18  Last Fill Quantity: 90 tablet,  # refills: 3   Last office visit: 4/16/2019 with prescribing provider:  Montse   Future Office Visit:      [facility] Follow-up to PHQ 1/23/2018 7/20/2018   PHQ-9 9. Suicide Ideation past 2 weeks Not at all Not at all     No flowsheet data found.      Requested Prescriptions   Pending Prescriptions Disp Refills     escitalopram (LEXAPRO) 10 MG tablet [Pharmacy Med Name: ESCITALOPRAM 10MG TABLETS] 90 tablet 0     Sig: TAKE 1 TABLET(10 MG) BY MOUTH DAILY       SSRIs Protocol Failed - 10/29/2019  3:04 AM        Failed - PHQ-9 score less than 5 in past 6 months     Please review last PHQ-9 score.           Failed - Recent (6 mo) or future (30 days) visit within the authorizing provider's specialty     Patient had office visit in the last 6 months or has a visit in the next 30 days with authorizing provider or within the authorizing provider's specialty.  See \"Patient Info\" tab in inbasket, or \"Choose Columns\" in Meds & Orders section of the refill encounter.            Passed - Medication is active on med list        Passed - Patient is age 18 or older          "

## 2019-10-30 RX ORDER — ESCITALOPRAM OXALATE 10 MG/1
TABLET ORAL
Qty: 30 TABLET | Refills: 0 | Status: SHIPPED | OUTPATIENT
Start: 2019-10-30 | End: 2019-11-27

## 2019-10-30 NOTE — TELEPHONE ENCOUNTER
Pt is due for follow-up OV. Refilled #30 with note to pharmacy to inform patient to schedule an appointment- pt was sent a MyC message    Maile RAI RN

## 2019-11-05 ENCOUNTER — HEALTH MAINTENANCE LETTER (OUTPATIENT)
Age: 24
End: 2019-11-05

## 2019-11-05 ENCOUNTER — MYC REFILL (OUTPATIENT)
Dept: FAMILY MEDICINE | Facility: CLINIC | Age: 24
End: 2019-11-05

## 2019-11-05 DIAGNOSIS — F33.41 RECURRENT MAJOR DEPRESSIVE DISORDER, IN PARTIAL REMISSION (H): ICD-10-CM

## 2019-11-06 RX ORDER — LISDEXAMFETAMINE DIMESYLATE 30 MG/1
30 CAPSULE ORAL EVERY MORNING
Qty: 30 CAPSULE | Refills: 0 | Status: SHIPPED | OUTPATIENT
Start: 2019-11-06 | End: 2019-12-31

## 2019-11-06 NOTE — TELEPHONE ENCOUNTER
Vyvanse 30 mg      Last Written Prescription Date:  8/8/2019  Last Fill Quantity: 30,   # refills: 0  Last Office Visit: 4/16/2019 Montse  Future Office visit:    Next 5 appointments (look out 90 days)    Dec 17, 2019  3:00 PM CST  Manuel Del Cid with Santi Willard [facility]  [facility] [address] [facility] [address]  [phone]       [facility]:  08/08/19 No Concerns   [email or web address]    Routing refill request to provider for review/approval because:  Drug not on the FMG, P or  Health refill protocol or controlled substance  Patient over due for office visit per protocol.    MARLEY RodriguezN, RN  [employer]

## 2019-11-27 DIAGNOSIS — F33.41 RECURRENT MAJOR DEPRESSIVE DISORDER, IN PARTIAL REMISSION (H): ICD-10-CM

## 2019-11-27 RX ORDER — ESCITALOPRAM OXALATE 10 MG/1
TABLET ORAL
Qty: 30 TABLET | Refills: 0 | Status: SHIPPED | OUTPATIENT
Start: 2019-11-27 | End: 2019-12-26

## 2019-11-27 NOTE — TELEPHONE ENCOUNTER
A 30 day supply is given, patient is due for an office visit.  Patient has appointment scheduled for 12/17/19 with provider.  HAYLEY Rodriguez, RN  [employer]

## 2019-11-27 NOTE — TELEPHONE ENCOUNTER
"Last Written Prescription Date:  10/30/19  Last Fill Quantity: 30,  # refills: 0   Last office visit: 4/16/2019 with prescribing provider:  4/16/19   Future Office Visit:   Next 5 appointments (look out 90 days)    Dec 17, 2019  3:00 PM [place] Del Cid with [facility] [address] [facility] [id number] [phone]         Requested Prescriptions   Pending Prescriptions Disp Refills     escitalopram (LEXAPRO) 10 MG tablet [Pharmacy Med Name: ESCITALOPRAM 10MG TABLETS] 30 tablet 0     Sig: TAKE 1 TABLET BY MOUTH EVERY DAY       SSRIs Protocol Passed - 11/27/2019  4:47 AM        Passed - PHQ-9 score less than 5 in past 6 months     Please review last PHQ-9 score.           Passed - Medication is active on med list        Passed - Patient is age 18 or older        Passed - Recent (6 mo) or future (30 days) visit within the authorizing provider's specialty     Patient had office visit in the last 6 months or has a visit in the next 30 days with authorizing provider or within the authorizing provider's specialty.  See \"Patient Info\" tab in inbasket, or \"Choose Columns\" in Meds & Orders section of the refill encounter.              "

## 2019-12-25 DIAGNOSIS — F33.41 RECURRENT MAJOR DEPRESSIVE DISORDER, IN PARTIAL REMISSION (H): ICD-10-CM

## 2019-12-26 RX ORDER — ESCITALOPRAM OXALATE 10 MG/1
TABLET ORAL
Qty: 30 TABLET | Refills: 0 | Status: SHIPPED | OUTPATIENT
Start: 2019-12-26 | End: 2020-11-30

## 2019-12-26 NOTE — TELEPHONE ENCOUNTER
"Pending Prescriptions:                       Disp   Refills    escitalopram (LEXAPRO) 10 MG tablet [Phar*30 tab*0            Sig: TAKE 1 TABLET BY MOUTH EVERY DAY    Last Written Prescription Date:  11/27/2019  Last Fill Quantity: 30,  # refills: 0   Last office visit: 4/16/2019 with prescribing provider:     Future Office Visit:    Requested Prescriptions   Pending Prescriptions Disp Refills     escitalopram (LEXAPRO) 10 MG tablet [Pharmacy Med Name: ESCITALOPRAM 10MG TABLETS] 30 tablet 0     Sig: TAKE 1 TABLET BY MOUTH EVERY DAY       SSRIs Protocol Failed - 12/25/2019  3:05 AM        Failed - Recent (6 mo) or future (30 days) visit within the authorizing provider's specialty     Patient had office visit in the last 6 months or has a visit in the next 30 days with authorizing provider or within the authorizing provider's specialty.  See \"Patient Info\" tab in inbasket, or \"Choose Columns\" in Meds & Orders section of the refill encounter.            Passed - PHQ-9 score less than 5 in past 6 months     Please review last PHQ-9 score.           Passed - Medication is active on med list        Passed - Patient is age 18 or older          "

## 2019-12-26 NOTE — TELEPHONE ENCOUNTER
Sent 30 day supply with note to reschedule. Clinic has cancelled on patient twice.     Tatyana Cohn RN

## 2019-12-30 DIAGNOSIS — F33.41 RECURRENT MAJOR DEPRESSIVE DISORDER, IN PARTIAL REMISSION (H): ICD-10-CM

## 2019-12-30 NOTE — TELEPHONE ENCOUNTER
Reason for Call:  Medication or medication refill:    Do you use a [facility]?  Name of the pharmacy and phone number for the current request:       [id number] - [place], [place] - [address]    Name of the medication requested: lisdexamfetamine (VYVANSE) 30 MG capsule    Other request: Please refill  Scheduled on 1/31 for Physical    Can we leave a detailed message on this number? YES    Phone number patient can be reached at: Home number on file [phone] (home)    Best Time: anytime    Call taken on 12/30/2019 at 12:17 PM by Angelica Yip

## 2019-12-31 RX ORDER — LISDEXAMFETAMINE DIMESYLATE 30 MG/1
30 CAPSULE ORAL EVERY MORNING
Qty: 30 CAPSULE | Refills: 0 | Status: SHIPPED | OUTPATIENT
Start: 2019-12-31 | End: 2020-01-31

## 2019-12-31 NOTE — TELEPHONE ENCOUNTER
lisdexamfetamine (VYVANSE) 30 MG capsule 30 capsule 0 11/6/2019  No   Sig - Route: Take 1 capsule (30 mg) by mouth every morning      Last Written Prescription Date:  11/06/2019  Last Fill Quantity: 30,  # refills: 0   Last office visit: 4/16/2019 with prescribing provider:     Future Office Visit:   Next 5 appointments (look out 90 days)    Jan 31, 2020  2:30 PM CST  PHYSICAL with Santi Willard MD  [facility] ([facility]) [address] [facility] [address]  [phone]              Routing refill request to provider for review/approval because:  Drug not on the FMG, UMP or  Health refill protocol or controlled substance

## 2020-04-13 ENCOUNTER — MYC REFILL (OUTPATIENT)
Dept: FAMILY MEDICINE | Facility: CLINIC | Age: 25
End: 2020-04-13

## 2020-04-13 DIAGNOSIS — F33.41 RECURRENT MAJOR DEPRESSIVE DISORDER, IN PARTIAL REMISSION (H): ICD-10-CM

## 2020-04-13 DIAGNOSIS — F98.8 ATTENTION DEFICIT DISORDER, UNSPECIFIED HYPERACTIVITY PRESENCE: ICD-10-CM

## 2020-04-14 RX ORDER — LISDEXAMFETAMINE DIMESYLATE 30 MG/1
30 CAPSULE ORAL EVERY MORNING
Qty: 30 CAPSULE | Refills: 0 | Status: SHIPPED | OUTPATIENT
Start: 2020-04-14 | End: 2020-06-30

## 2020-04-14 NOTE — TELEPHONE ENCOUNTER
Routing refill request to provider for review/approval because:  Drug not on the FMG refill protocol   RX [place]) reviewed:  reviewed- no concerns 4/14/2020 [place]    [place]:  [email or web address]        HAYLEY Rodriguez, RN  [employer] Triage

## 2020-06-30 ENCOUNTER — MYC REFILL (OUTPATIENT)
Dept: FAMILY MEDICINE | Facility: CLINIC | Age: 25
End: 2020-06-30

## 2020-06-30 DIAGNOSIS — F33.41 RECURRENT MAJOR DEPRESSIVE DISORDER, IN PARTIAL REMISSION (H): ICD-10-CM

## 2020-07-01 RX ORDER — LISDEXAMFETAMINE DIMESYLATE 30 MG/1
30 CAPSULE ORAL EVERY MORNING
Qty: 30 CAPSULE | Refills: 0 | Status: SHIPPED | OUTPATIENT
Start: 2020-07-01 | End: 2020-10-26

## 2020-07-01 NOTE — TELEPHONE ENCOUNTER
Sent patient a my chart message an OV is needed for further refills.  Patient has cancelled x 2 and NO SHOWED x 2.    Routing to provider as JOVANI.    MARLEY RodriguezN, RN  [employer] Triage

## 2020-10-26 ENCOUNTER — MYC REFILL (OUTPATIENT)
Dept: FAMILY MEDICINE | Facility: CLINIC | Age: 25
End: 2020-10-26

## 2020-10-26 DIAGNOSIS — F33.41 RECURRENT MAJOR DEPRESSIVE DISORDER, IN PARTIAL REMISSION (H): ICD-10-CM

## 2020-10-28 NOTE — TELEPHONE ENCOUNTER
[place]      Last Written Prescription Date:  07/01/2020  Last Fill Quantity: 30,   # refills: 0  Last Office Visit: 04/06/19  Future Office visit:       Routing refill request to provider for review/approval because:  Drug not on the FMG, UMP or [facility] refill protocol or controlled substance    Jessy Castro MA

## 2020-10-28 NOTE — TELEPHONE ENCOUNTER
Pt is due for a visit (Last visit 4/2019)    [employer] message sent to Pt to schedule visit     Last RX written 7/2020

## 2020-10-29 RX ORDER — LISDEXAMFETAMINE DIMESYLATE 30 MG/1
30 CAPSULE ORAL EVERY MORNING
Qty: 30 CAPSULE | Refills: 0 | Status: SHIPPED | OUTPATIENT
Start: 2020-10-29 | End: 2020-11-30

## 2020-11-22 ENCOUNTER — HEALTH MAINTENANCE LETTER (OUTPATIENT)
Age: 25
End: 2020-11-22

## 2020-11-30 ENCOUNTER — VIRTUAL VISIT (OUTPATIENT)
Dept: FAMILY MEDICINE | Facility: CLINIC | Age: 25
End: 2020-11-30
Payer: COMMERCIAL

## 2020-11-30 DIAGNOSIS — F33.41 RECURRENT MAJOR DEPRESSIVE DISORDER, IN PARTIAL REMISSION (H): ICD-10-CM

## 2020-11-30 PROCEDURE — 99213 OFFICE O/P EST LOW 20 MIN: CPT | Mod: TEL | Performed by: INTERNAL MEDICINE

## 2020-11-30 RX ORDER — LISDEXAMFETAMINE DIMESYLATE 30 MG/1
30 CAPSULE ORAL EVERY MORNING
Qty: 30 CAPSULE | Refills: 0 | Status: SHIPPED | OUTPATIENT
Start: 2020-11-30 | End: 2020-11-30

## 2020-11-30 RX ORDER — LISDEXAMFETAMINE DIMESYLATE 30 MG/1
30 CAPSULE ORAL EVERY MORNING
Qty: 30 CAPSULE | Refills: 0 | Status: SHIPPED | OUTPATIENT
Start: 2020-11-30 | End: 2020-12-04

## 2020-11-30 ASSESSMENT — PATIENT HEALTH QUESTIONNAIRE - PHQ9: SUM OF ALL RESPONSES TO PHQ QUESTIONS 1-9: 0

## 2020-11-30 NOTE — PROGRESS NOTES
"Ha Lan is a 24 year old male who is being evaluated via a billable telephone visit.      The patient has been notified of following:     \"This telephone visit will be conducted via a call between you and your physician/provider. We have found that certain health care needs can be provided without the need for a physical exam.  This service lets us provide the care you need with a short phone conversation.  If a prescription is necessary we can send it directly to your pharmacy.  If lab work is needed we can place an order for that and you can then stop by our lab to have the test done at a later time.    Telephone visits are billed at different rates depending on your insurance coverage. During this emergency period, for some insurers they may be billed the same as an in-person visit.  Please reach out to your insurance provider with any questions.    If during the course of the call the physician/provider feels a telephone visit is not appropriate, you will not be charged for this service.\"    Patient has given verbal consent for Telephone visit?  Yes    What phone number would you like to be contacted at? [phone]    How would you like to obtain your AVS? Mail a copy    Subjective     Ha Lan is a 24 year old male who presents via phone visit today for the following health issues:    He is doing well overall, some stresses, works emergency room tech as noted.  He feels like the depression and anxiety are doing well, no issues, not down or feeling sad or depressed and anxiety is controlled.      He has add and using vyvanse and that works well.   He takes it prn, usually 7 to 8 out of 14 days or so  It works well and no side effects from it x slight dry mouth.    He otherwise feels fine and has no c/o.  He is not sure if has hpv vaccine and I rec he get it if not ,he will see if has had it.    Past Medical History:   Diagnosis Date     ADD (attention deficit disorder) 3rd grade    prior use of " adderal     Anxiety 2010    on prozac, some mild depression     Major depression in partial remission (H)      Past Surgical History:   Procedure Laterality Date     circumcision revision  baby     HC REPAIR OF HYDROCELE,TUNICA  baby     uvuloplasty  2016    done by ent     Social History     Socioeconomic History     Marital status: Single     Spouse name: Not on file     Number of children: 0     Years of education: Not on file     Highest education level: Not on file   Occupational History     Occupation: er tech   Social Needs     Financial resource strain: Not on file     Food insecurity     Worry: Not on file     Inability: Not on file     Transportation needs     Medical: Not on file     Non-medical: Not on file   Tobacco Use     Smoking status: Never Smoker     Smokeless tobacco: Never Used   Substance and Sexual Activity     Alcohol use: Yes     Comment: occ     Drug use: No     Sexual activity: Never   Lifestyle     Physical activity     Days per week: Not on file     Minutes per session: Not on file     Stress: Not on file   Relationships     Social connections     Talks on phone: Not on file     Gets together: Not on file     Attends [employer] service: Not on file     Active member of club or organization: Not on file     Attends meetings of clubs or organizations: Not on file     Relationship status: Not on file     Intimate partner violence     Fear of current or ex partner: Not on file     Emotionally abused: Not on file     Physically abused: Not on file     Forced sexual activity: Not on file   Other Topics Concern     Parent/sibling w/ CABG, MI or angioplasty before 65F 55M? Not Asked   Social History Narrative     Not on file     Current Outpatient Medications   Medication Sig Dispense Refill     lisdexamfetamine (VYVANSE) 30 MG capsule Take 1 capsule (30 mg) by mouth every morning 30 capsule 0     Allergies   Allergen Reactions     Ceclor [Cefaclor] Rash     FAMILY HISTORY NOTED AND  REVIEWED    REVIEW OF SYSTEMS: above    PHYSICAL EXAM    There were no vitals taken for this visit.    ASSESSMENT:  1. Add, stable, no signs med abuse  2. Anxiety and depression, no issues  3. Health care maintenance    PLAN:  Refilled med  He will check on hpv status  Exercise  Call if problems    Santi Willard M.D.  Time 7 minutes

## 2020-11-30 NOTE — PROGRESS NOTES
"Ha Lan is a 24 year old male who is being evaluated via a billable telephone visit.      The patient has been notified of following:     \"This telephone visit will be conducted via a call between you and your physician/provider. We have found that certain health care needs can be provided without the need for a physical exam.  This service lets us provide the care you need with a short phone conversation.  If a prescription is necessary we can send it directly to your pharmacy.  If lab work is needed we can place an order for that and you can then stop by our lab to have the test done at a later time.    Telephone visits are billed at different rates depending on your insurance coverage. During this emergency period, for some insurers they may be billed the same as an in-person visit.  Please reach out to your insurance provider with any questions.    If during the course of the call the physician/provider feels a telephone visit is not appropriate, you will not be charged for this service.\"    Patient has given verbal consent for Telephone visit?  Yes    What phone number would you like to be contacted at? [phone]    How would you like to obtain your AVS? Mail a copy    Subjective     Ha Lan is a 24 year old male who presents via phone visit today for the following health issues:    HPI     {SUPERLIST (Optional):554195}  {PEDS Chronic and Acute Problems (Optional):760576}     {additonal problems for provider to add (Optional):344920}    Review of Systems   {ROS COMP (Optional):951989}       Objective          Vitals:  No vitals were obtained today due to virtual visit.    {GENERAL APPEARANCE:50::\"healthy\",\"alert\",\"no distress\"}  PSYCH: Alert and oriented times 3; coherent speech, normal   rate and volume, able to articulate logical thoughts, able   to abstract reason, no tangential thoughts, no hallucinations   or delusions  His affect is { :7687038::\"normal\"}  RESP: No cough, no audible wheezing, " able to talk in full sentences  Remainder of exam unable to be completed due to telephone visits    {Diagnostic Test Results (Optional):708614}        Assessment/Plan:    {PROVIDER CHARTING PREFERENCE SOAPO:823675}    Phone call duration:  *** minutes

## 2020-12-04 DIAGNOSIS — F33.41 RECURRENT MAJOR DEPRESSIVE DISORDER, IN PARTIAL REMISSION (H): ICD-10-CM

## 2020-12-04 RX ORDER — LISDEXAMFETAMINE DIMESYLATE 30 MG/1
30 CAPSULE ORAL EVERY MORNING
Qty: 30 CAPSULE | Refills: 0 | Status: SHIPPED | OUTPATIENT
Start: 2020-12-04 | End: 2021-03-29

## 2020-12-04 NOTE — TELEPHONE ENCOUNTER
Fax from pharmacy - this Rx cannot be faxed in.  It must come either in hard copy format (patient can walk it in) or via E-scribe, or mailed in.    Please resend.    LOV virtual 11- PG    Pended as deangelo.    RT Christian (R)

## 2021-09-08 ENCOUNTER — MYC REFILL (OUTPATIENT)
Dept: FAMILY MEDICINE | Facility: CLINIC | Age: 26
End: 2021-09-08

## 2021-09-08 DIAGNOSIS — F33.41 RECURRENT MAJOR DEPRESSIVE DISORDER, IN PARTIAL REMISSION (H): ICD-10-CM

## 2021-09-09 RX ORDER — LISDEXAMFETAMINE DIMESYLATE 30 MG/1
30 CAPSULE ORAL EVERY MORNING
Qty: 30 CAPSULE | Refills: 0 | Status: SHIPPED | OUTPATIENT
Start: 2021-09-09 | End: 2021-12-06

## 2021-09-09 NOTE — TELEPHONE ENCOUNTER
Last visit 11/30/2020     Routing refill request to provider for review/approval because:  Drug not on the [facility] refill protocol     Donna LOCO RN      lisdexamfetamine (VYVANSE) 30 MG capsule 30 capsule 0 6/24/2021  No   Sig - Route: Take 1 capsule (30 mg) by mouth every morning - Oral   Sent to pharmacy as: Lisdexamfetamine Dimesylate 30 MG Oral Capsule (VYVANSE)   Class: E-Prescribe   Earliest Fill Date: 6/24/2021   Order: [id number]   E-Prescribing Status: Receipt confirmed by pharmacy (6/24/2021  7:48 PM CDT)

## 2021-09-19 ENCOUNTER — HEALTH MAINTENANCE LETTER (OUTPATIENT)
Age: 26
End: 2021-09-19

## 2021-12-06 ENCOUNTER — MYC REFILL (OUTPATIENT)
Dept: FAMILY MEDICINE | Facility: CLINIC | Age: 26
End: 2021-12-06
Payer: COMMERCIAL

## 2021-12-06 DIAGNOSIS — F33.41 RECURRENT MAJOR DEPRESSIVE DISORDER, IN PARTIAL REMISSION (H): ICD-10-CM

## 2021-12-07 RX ORDER — LISDEXAMFETAMINE DIMESYLATE 30 MG/1
30 CAPSULE ORAL EVERY MORNING
Qty: 30 CAPSULE | Refills: 0 | Status: SHIPPED | OUTPATIENT
Start: 2021-12-07 | End: 2022-01-28

## 2022-01-09 ENCOUNTER — HEALTH MAINTENANCE LETTER (OUTPATIENT)
Age: 27
End: 2022-01-09

## 2022-01-28 ENCOUNTER — MYC REFILL (OUTPATIENT)
Dept: FAMILY MEDICINE | Facility: CLINIC | Age: 27
End: 2022-01-28
Payer: COMMERCIAL

## 2022-01-28 DIAGNOSIS — F33.41 RECURRENT MAJOR DEPRESSIVE DISORDER, IN PARTIAL REMISSION (H): ICD-10-CM

## 2022-01-31 RX ORDER — LISDEXAMFETAMINE DIMESYLATE 30 MG/1
30 CAPSULE ORAL EVERY MORNING
Qty: 30 CAPSULE | Refills: 0 | Status: SHIPPED | OUTPATIENT
Start: 2022-01-31 | End: 2022-02-04

## 2022-01-31 NOTE — TELEPHONE ENCOUNTER
Patient already scheduled  FEB 4 2022 10:30 AM - Provider Visit  [facility] - MD Jesse Rojo, RN  [facility]

## 2022-01-31 NOTE — TELEPHONE ENCOUNTER
Routing refill request to provider for review/approval because:  Drug not on the [facility] refill protocol   Keren He RN

## 2022-02-04 ENCOUNTER — OFFICE VISIT (OUTPATIENT)
Dept: FAMILY MEDICINE | Facility: CLINIC | Age: 27
End: 2022-02-04
Payer: COMMERCIAL

## 2022-02-04 VITALS
WEIGHT: 225 LBS | OXYGEN SATURATION: 99 % | RESPIRATION RATE: 16 BRPM | HEIGHT: 74 IN | DIASTOLIC BLOOD PRESSURE: 78 MMHG | TEMPERATURE: 97.5 F | BODY MASS INDEX: 28.88 KG/M2 | SYSTOLIC BLOOD PRESSURE: 130 MMHG | HEART RATE: 86 BPM

## 2022-02-04 DIAGNOSIS — F41.9 ANXIETY: ICD-10-CM

## 2022-02-04 DIAGNOSIS — F33.41 RECURRENT MAJOR DEPRESSIVE DISORDER, IN PARTIAL REMISSION (H): ICD-10-CM

## 2022-02-04 DIAGNOSIS — F98.8 ATTENTION DEFICIT DISORDER, UNSPECIFIED HYPERACTIVITY PRESENCE: Primary | ICD-10-CM

## 2022-02-04 PROCEDURE — 99213 OFFICE O/P EST LOW 20 MIN: CPT | Performed by: INTERNAL MEDICINE

## 2022-02-04 RX ORDER — LISDEXAMFETAMINE DIMESYLATE 30 MG/1
30 CAPSULE ORAL EVERY MORNING
Qty: 30 CAPSULE | Refills: 0 | Status: SHIPPED | OUTPATIENT
Start: 2022-02-04 | End: 2022-04-25

## 2022-02-04 ASSESSMENT — MIFFLIN-ST. JEOR: SCORE: 2060.34

## 2022-02-04 NOTE — PROGRESS NOTES
This is a very pleasant 26-year-old here for follow-up on his ADD as well as depression and anxiety.  He is using Vyvanse before work, not daily and his PMD P verifies this.  There is no signs of abuse.  He does not smoke or drink excessively no drug use.  He works overnights as an ER tech.  He is single and no kids and not sexually active.  He is doing very well.  No significant depression or anxiety issues.  His weight is an issue.  He is not working out a lot.    Past Medical History:   Diagnosis Date     ADD (attention deficit disorder) 3rd grade    prior use of adderal     Anxiety 2010    on prozac, some mild depression     Major depression in partial remission (H)      Past Surgical History:   Procedure Laterality Date     circumcision revision  baby     HC REPAIR OF HYDROCELE,TUNICA  baby     uvuloplasty  2016    done by ent     Social History     Socioeconomic History     Marital status: Single     Spouse name: Not on file     Number of children: 0     Years of education: Not on file     Highest education level: Not on file   Occupational History     Occupation: er tech   Tobacco Use     Smoking status: Never Smoker     Smokeless tobacco: Never Used   Substance and Sexual Activity     Alcohol use: Yes     Comment: occ     Drug use: No     Sexual activity: Not Currently     Partners: Female   Other Topics Concern     Parent/sibling w/ CABG, MI or angioplasty before 65F 55M? No   Social History Narrative     Not on file     Social Determinants of Health     Financial Resource Strain: Not on file   Food Insecurity: Not on file   Transportation Needs: Not on file   Physical Activity: Not on file   Stress: Not on file   Social Connections: Not on file   Intimate Partner Violence: Not on file   Housing Stability: Not on file     Current Outpatient Medications   Medication Sig Dispense Refill     lisdexamfetamine (VYVANSE) 30 MG capsule Take 1 capsule (30 mg) by mouth every morning 30 capsule 0     Allergies  "  Allergen Reactions     Ceclor [Cefaclor] Rash     FAMILY HISTORY NOTED AND REVIEWED    REVIEW OF SYSTEMS: above    PHYSICAL EXAM    /78   Pulse 86   Temp 97.5  F (36.4  C) (Tympanic)   Resp 16   Ht 1.88 m (6' 2\")   Wt 102.1 kg (225 lb)   SpO2 99%   BMI 28.89 kg/m      Patient appears non toxic  Lungs - clear, normal flow  Cardiovascular - regular rate and rhythm, no murmer, rub or gallop, no jvp or edema, carotids within normal limits, no bruits.  Abdomen - normal active bowel sounds, soft, non tender, no masses, guarding or rebound, no hepatosplenomegaly    Labs to return to clinic for fasting    ASSESSMENT:  1. Depression and anxieyt, doing well  2. Add, med works well, no signs abuse  3. Prior elevated alt, follow up labs  4. hcm    PLAN:  He will check on  hpv status  Exercise, diet  Follow up labs      Santi Willard M.D.          "

## 2022-02-04 NOTE — NURSING NOTE
"Ha Lan is a 26 year old patient who comes in today for a Blood Pressure check.  Initial BP:  BP (!) 140/84 (BP Location: Right arm, Patient Position: Chair, Cuff Size: Adult Large)   Pulse 86   Temp 97.5  F (36.4  C) (Tympanic)   Resp 16   Ht 1.88 m (6' 2\")   Wt 102.1 kg (225 lb)   SpO2 99%   BMI 28.89 kg/m       86  Disposition: provider notified while patient in the clinic  Desi Campos CMA      "

## 2022-02-18 ENCOUNTER — LAB (OUTPATIENT)
Dept: LAB | Facility: CLINIC | Age: 27
End: 2022-02-18
Payer: COMMERCIAL

## 2022-02-18 DIAGNOSIS — F33.41 RECURRENT MAJOR DEPRESSIVE DISORDER, IN PARTIAL REMISSION (H): ICD-10-CM

## 2022-02-18 LAB
ALBUMIN SERPL-MCNC: 4.5 G/DL (ref 3.4–5)
ALP SERPL-CCNC: 69 U/L (ref 40–150)
ALT SERPL W P-5'-P-CCNC: 73 U/L (ref 0–70)
AST SERPL W P-5'-P-CCNC: 23 U/L (ref 0–45)
BILIRUB DIRECT SERPL-MCNC: 0.2 MG/DL (ref 0–0.2)
BILIRUB SERPL-MCNC: 0.6 MG/DL (ref 0.2–1.3)
CHOLEST SERPL-MCNC: 151 MG/DL
ERYTHROCYTE [DISTWIDTH] IN BLOOD BY AUTOMATED COUNT: 12.6 % (ref 10–15)
FASTING STATUS PATIENT QL REPORTED: YES
FASTING STATUS PATIENT QL REPORTED: YES
GLUCOSE BLD-MCNC: 99 MG/DL (ref 70–99)
HCT VFR BLD AUTO: 49.1 % (ref 40–53)
HDLC SERPL-MCNC: 41 MG/DL
HGB BLD-MCNC: 16.4 G/DL (ref 13.3–17.7)
LDLC SERPL CALC-MCNC: 92 MG/DL
MCH RBC QN AUTO: 30.4 PG (ref 26.5–33)
MCHC RBC AUTO-ENTMCNC: 33.4 G/DL (ref 31.5–36.5)
MCV RBC AUTO: 91 FL (ref 78–100)
NONHDLC SERPL-MCNC: 110 MG/DL
PLATELET # BLD AUTO: 251 10E3/UL (ref 150–450)
PROT SERPL-MCNC: 7.8 G/DL (ref 6.8–8.8)
RBC # BLD AUTO: 5.4 10E6/UL (ref 4.4–5.9)
TRIGL SERPL-MCNC: 89 MG/DL
WBC # BLD AUTO: 5.4 10E3/UL (ref 4–11)

## 2022-02-18 PROCEDURE — 80061 LIPID PANEL: CPT

## 2022-02-18 PROCEDURE — 82947 ASSAY GLUCOSE BLOOD QUANT: CPT

## 2022-02-18 PROCEDURE — 85027 COMPLETE CBC AUTOMATED: CPT

## 2022-02-18 PROCEDURE — 36415 COLL VENOUS BLD VENIPUNCTURE: CPT

## 2022-02-18 PROCEDURE — 80076 HEPATIC FUNCTION PANEL: CPT

## 2022-02-20 NOTE — RESULT ENCOUNTER NOTE
Ha,    As you can see your labs look very good.  Your cbc is normal and the sugar is normal so no diabetes.  Your cholesterol is very good at 151.  Your hdl or good cholesterol is good at 41 and your ldl or bad is also very good at 92.    The only thing slightly off is one of the liver tests.  This is usually due to some extra fat going to the liver.  I would like to keep an eye on this.  The best way to lower this is to get your weight down a bit which I would encourage.  To be safe let's do a repeat liver test in 6 months.  You do not need to fast or see me for this but please schedule it on your own using [employer].  If you can not do this just give us a call.    If you have any questions please call me.      Santi Willard M.D.

## 2022-04-25 ENCOUNTER — MYC REFILL (OUTPATIENT)
Dept: FAMILY MEDICINE | Facility: CLINIC | Age: 27
End: 2022-04-25
Payer: COMMERCIAL

## 2022-04-25 DIAGNOSIS — F33.41 RECURRENT MAJOR DEPRESSIVE DISORDER, IN PARTIAL REMISSION (H): ICD-10-CM

## 2022-04-26 RX ORDER — LISDEXAMFETAMINE DIMESYLATE 30 MG/1
30 CAPSULE ORAL EVERY MORNING
Qty: 30 CAPSULE | Refills: 0 | Status: SHIPPED | OUTPATIENT
Start: 2022-04-26 | End: 2022-07-05

## 2022-07-05 ENCOUNTER — MYC REFILL (OUTPATIENT)
Dept: FAMILY MEDICINE | Facility: CLINIC | Age: 27
End: 2022-07-05

## 2022-07-05 DIAGNOSIS — F33.41 RECURRENT MAJOR DEPRESSIVE DISORDER, IN PARTIAL REMISSION (H): ICD-10-CM

## 2022-07-05 RX ORDER — LISDEXAMFETAMINE DIMESYLATE 30 MG/1
30 CAPSULE ORAL EVERY MORNING
Qty: 30 CAPSULE | Refills: 0 | Status: SHIPPED | OUTPATIENT
Start: 2022-07-05 | End: 2022-09-16

## 2022-07-05 NOTE — TELEPHONE ENCOUNTER
Routing refill request to provider for review/approval because:  Drug not on the [facility] refill protocol      KAYLAH Freeman  [facility]

## 2022-09-16 ENCOUNTER — MYC REFILL (OUTPATIENT)
Dept: FAMILY MEDICINE | Facility: CLINIC | Age: 27
End: 2022-09-16

## 2022-09-16 DIAGNOSIS — F33.41 RECURRENT MAJOR DEPRESSIVE DISORDER, IN PARTIAL REMISSION (H): ICD-10-CM

## 2022-09-16 RX ORDER — LISDEXAMFETAMINE DIMESYLATE 30 MG/1
30 CAPSULE ORAL EVERY MORNING
Qty: 30 CAPSULE | Refills: 0 | Status: SHIPPED | OUTPATIENT
Start: 2022-09-16 | End: 2022-10-19

## 2022-10-19 ENCOUNTER — MYC REFILL (OUTPATIENT)
Dept: FAMILY MEDICINE | Facility: CLINIC | Age: 27
End: 2022-10-19

## 2022-10-19 DIAGNOSIS — F33.41 RECURRENT MAJOR DEPRESSIVE DISORDER, IN PARTIAL REMISSION (H): ICD-10-CM

## 2022-10-20 RX ORDER — LISDEXAMFETAMINE DIMESYLATE 30 MG/1
30 CAPSULE ORAL EVERY MORNING
Qty: 30 CAPSULE | Refills: 0 | Status: SHIPPED | OUTPATIENT
Start: 2022-10-20

## 2022-11-20 ENCOUNTER — HEALTH MAINTENANCE LETTER (OUTPATIENT)
Age: 27
End: 2022-11-20

## 2023-03-15 NOTE — TELEPHONE ENCOUNTER
[place]      Last Written Prescription Date:  05/16/15  Last Fill Quantity: unk,   # refills: unk  Last Office Visit: 01/23/18  Future Office visit:    Next 5 appointments (look out 90 days)     Jul 20, 2018  2:30 PM CDT   Office Visit with Santi Willard MD   [facility]    [address] [facility] [address]   [phone]                   Routing refill request to provider for review/approval because:  Medication is reported/historical    Jessy Castro MA       Sent via portal.

## 2023-04-16 ENCOUNTER — HEALTH MAINTENANCE LETTER (OUTPATIENT)
Age: 28
End: 2023-04-16

## 2023-12-29 NOTE — NURSING NOTE
Screening Questionnaire for Adult Immunization    Are you sick today?   No   Do you have allergies to medications, food, a vaccine component or latex?   No   Have you ever had a serious reaction after receiving a vaccination?   No   Do you have a long-term health problem with heart disease, lung disease, asthma, kidney disease, metabolic disease (e.g. diabetes), anemia, or other blood disorder?   No   Do you have cancer, leukemia, HIV/AIDS, or any other immune system problem?   No   In the past 3 months, have you taken medications that affect  your immune system, such as prednisone, other steroids, or anticancer drugs; drugs for the treatment of rheumatoid arthritis, Crohn s disease, or psoriasis; or have you had radiation treatments?   No   Have you had a seizure, or a brain or other nervous system problem?   No   During the past year, have you received a transfusion of blood or blood     products, or been given immune (gamma) globulin or antiviral drug?   No   For women: Are you pregnant or is there a chance you could become        pregnant during the next month?   No   Have you received any vaccinations in the past 4 weeks?   No     Immunization questionnaire answers were all negative.        Per orders of Dr. Willard, injection of TDAP given by Desi Oliver. Patient instructed to remain in clinic for 15 minutes afterwards, and to report any adverse reaction to me immediately.       Screening performed by Desi Oliver on 4/16/2019 at 3:17 PM.  Due to injection administration, patient instructed to remain in clinic for 15 minutes  afterwards, and to report any adverse reaction to me immediately.      
Detail Level: Simple

## 2024-06-22 ENCOUNTER — HEALTH MAINTENANCE LETTER (OUTPATIENT)
Age: 29
End: 2024-06-22